# Patient Record
Sex: FEMALE | Race: ASIAN | NOT HISPANIC OR LATINO | Employment: UNEMPLOYED | URBAN - METROPOLITAN AREA
[De-identification: names, ages, dates, MRNs, and addresses within clinical notes are randomized per-mention and may not be internally consistent; named-entity substitution may affect disease eponyms.]

---

## 2017-05-08 ENCOUNTER — GYNECOLOGY VISIT (OUTPATIENT)
Dept: OBGYN | Facility: CLINIC | Age: 42
End: 2017-05-08
Payer: OTHER MISCELLANEOUS

## 2017-05-08 VITALS — BODY MASS INDEX: 22.12 KG/M2 | WEIGHT: 128 LBS | SYSTOLIC BLOOD PRESSURE: 102 MMHG | DIASTOLIC BLOOD PRESSURE: 62 MMHG

## 2017-05-08 DIAGNOSIS — N94.19 DYSPAREUNIA DUE TO MEDICAL CONDITION IN FEMALE: ICD-10-CM

## 2017-05-08 DIAGNOSIS — N91.2 AMENORRHEA: ICD-10-CM

## 2017-05-08 DIAGNOSIS — R10.2 PELVIC PAIN: ICD-10-CM

## 2017-05-08 DIAGNOSIS — N60.19 CHRONIC MASTITIS, UNSPECIFIED LATERALITY: ICD-10-CM

## 2017-05-08 PROCEDURE — 99204 OFFICE O/P NEW MOD 45 MIN: CPT | Performed by: OBSTETRICS & GYNECOLOGY

## 2017-05-08 NOTE — MR AVS SNAPSHOT
Trinidad Alvarado   2017 10:30 AM   Gynecology Visit   MRN: [id number]    Department:  [facility]   Dept Phone:  [phone]    Description:  Female : 1975   Provider:  Frankie Hager M.D.           Reason for Visit     New Patient           Allergies as of 2017     No Known Allergies      Vital Signs     Blood Pressure Weight Breastfeeding? Smoking Status          102/62 mmHg 58.06 kg (128 lb) Yes Never Smoker         Basic Information     Date Of Birth Sex Race Ethnicity Preferred Language    1975 Female  Non- English      Health Maintenance     Patient has no pending health maintenance at this time      Current Immunizations     Influenza Vaccine Quad Inj (Preserved) 2016  5:45 AM    Tdap Vaccine 2016  5:48 AM, 2016  2:30 PM      Below and/or attached are the medications your provider expects you to take. Review all of your home medications and newly ordered medications with your provider and/or pharmacist. Follow medication instructions as directed by your provider and/or pharmacist. Please keep your medication list with you and share with your provider. Update the information when medications are discontinued, doses are changed, or new medications (including over-the-counter products) are added; and carry medication information at all times in the event of emergency situations     Allergies:  No Known Allergies          Medications  Valid as of: May 08, 2017 - 11:30 AM    Generic Name Brand Name Tablet Size Instructions for use    Docusate Sodium (Cap)  MG Take 100 mg by mouth 2 times a day as needed for Constipation.        Hydrocortisone (Cream) hydrocortisone 1 % Apply cream to affected area BID PRN pain        Ibuprofen (Tab) MOTRIN 600 MG Take 1 Tab by mouth every 6 hours as needed.        Prenatal Multivit-Min-Fe-FA   Take  by mouth.        .                 Medicines prescribed today were sent to:     None      Medication refill  instructions:       If your prescription bottle indicates you have medication refills left, it is not necessary to call your provider’s office. Please contact your pharmacy and they will refill your medication.    If your prescription bottle indicates you do not have any refills left, you may request refills at any time through one of the following ways: The online [facility] system (except Urgent Care), by calling your provider’s office, or by asking your pharmacy to contact your provider’s office with a refill request. Medication refills are processed only during regular business hours and may not be available until the next business day. Your provider may request additional information or to have a follow-up visit with you prior to refilling your medication.   *Please Note: Medication refills are assigned a new Rx number when refilled electronically. Your pharmacy may indicate that no refills were authorized even though a new prescription for the same medication is available at the pharmacy. Please request the medicine by name with the pharmacy before contacting your provider for a refill.           [facility] [employer] Access Code: [email or web address]  Expires: 6/7/2017 11:30 AM    [facility]  A secure, online tool to manage your health information     [employer]® is a secure, online tool that connects you to your personalized health information from the privacy of your home -- day or night - making it very easy for you to manage your healthcare. Once the activation process is completed, you can even access your medical information using the [employer] ana rosa, which is available for free in the Apple [employer] store or Google Play store.     [facility] provides the following levels of access (as shown below):   My Chart Features   [facility] Doctor [facility]  Specialists [facility]  Urgent  Care [facility]  Primary Care  Doctor   Email your healthcare team securely and privately 24/7 X X X    Manage appointments: schedule your next  appointment; view details of past/upcoming appointments X      Request prescription refills. X      View recent personal medical records, including lab and immunizations X X X X   View health record, including health history, allergies, medications X X X X   Read reports about your outpatient visits, procedures, consult and ER notes X X X X   See your discharge summary, which is a recap of your hospital and/or ER visit that includes your diagnosis, lab results, and care plan. X X       How to register for [employer]:  1. Go to  [email or web address].  2. Click on the Sign Up Now box, which takes you to the New Member Sign Up page. You will need to provide the following information:  a. Enter your [employer] Access Code exactly as it appears at the top of this page. (You will not need to use this code after you’ve completed the sign-up process. If you do not sign up before the expiration date, you must request a new code.)   b. Enter your date of birth.   c. Enter your home email address.   d. Click Submit, and follow the next screen’s instructions.  3. Create a [employer] ID. This will be your [employer] login ID and cannot be changed, so think of one that is secure and easy to remember.  4. Create a [employer] password. You can change your password at any time.  5. Enter your Password Reset Question and Answer. This can be used at a later time if you forget your password.   6. Enter your e-mail address. This allows you to receive e-mail notifications when new information is available in [employer].  7. Click Sign Up. You can now view your health information.    For assistance activating your [employer] account, call [phone]

## 2017-05-08 NOTE — PROGRESS NOTES
Chief complaint; new patient    Trinidad Alvarado is a 41 y.o.  who presents complaining of pain during intercourse and frequent episodes of mastitis. Patient delivered generally  via normal spontaneous vaginal delivery she has been nursing she states she gets mastitis every 2-3 months. Her baby is currently consuming solid foods and doing well. She states her baby likes to nurse every 1-2 hours around-the-clock. Patient also complains of having pain during intercourse with vaginal dryness and decreased libido. No libido. No menstrual cycles. The patient states she takes antibiotics for 4-5 days when she gets mastitis. She states she's only had intercourse 2 times since childbirth. The patient states she develops mastitis if her baby does not nurse at which time she developes low-grade fever and takes antibiotics for 4-5 days. The patient is an OB/GYN.      Review of systems; denies fever chills abdominal pain, denies chest pain shortness of breath or urinary symptoms  Past medical history-History reviewed. No pertinent past medical history.  Past surgical history-  Past Surgical History   Procedure Laterality Date   • Other abdominal surgery       abdominal surgery as child     Allergies-Review of patient's allergies indicates no known allergies.  Medications-  Current Outpatient Prescriptions on File Prior to Visit   Medication Sig Dispense Refill   • docusate sodium 100 MG Cap Take 100 mg by mouth 2 times a day as needed for Constipation. 60 Cap 3   • hydrocortisone 1 % Cream Apply cream to affected area BID PRN pain 1 Tube 0   • ibuprofen (MOTRIN) 600 MG Tab Take 1 Tab by mouth every 6 hours as needed. 30 Tab 3   • Prenatal Multivit-Min-Fe-FA (PRENATAL 1 + IRON PO) Take  by mouth.       No current facility-administered medications on file prior to visit.     Social history-  Social History     Social History   • Marital Status:      Spouse Name: N/A   • Number of Children: N/A   • Years of  Education: N/A     Occupational History   • Not on file.     Social History Main Topics   • Smoking status: Never Smoker    • Smokeless tobacco: Not on file   • Alcohol Use: No   • Drug Use: No   • Sexual Activity:     Partners: Male     Other Topics Concern   • Not on file     Social History Narrative     Past Family History-no history of breast or ovarian cancer    Physical examination;  Alert and oriented x3  General a thin well-developed well-nourished female in no apparent distress  Filed Vitals:    05/08/17 1049   BP: 102/62   Weight: 58.06 kg (128 lb)     Skin is warm and dry  Neck-supple  HEENT-head-atraumatic, normocephalic, EOMI, PERRLA  Cardiovascular-regular rate and rhythm, normal S1-S2, no murmurs or gallops  Lungs-clear to auscultation bilaterally  Back-negative CVA tenderness  Abdomen-nondistended positive bowel sounds soft nontender no masses or hepatosplenomegaly  Pelvic examination;  External genitalia-no visible lesions   Vagina-no blood or discharge  Cervix-no gross lesions  Uterus-normal size and shape, nontender  Adnexa without mass or tenderness  Extremities without cyanosis clubbing or edema  Neurologic exam grossly intact    Urine hCG-negative    Impression;  Pelvic pain-completely normal physical examination  Dyspareunia  Decreased libido-likely secondary to continued nursing  Amenorrhea-likely secondary to continue nursing  Chronic mastitis?-Probable just breast engorgement      Plan;  Reassurance  Patient is encouraged to decrease nursing episodes to every 4-5 hours  Patient is encouraged to contact the office immediately if she develops mastitis I can examine her  Patient has not had a slight mammogram but is still nursing-assisted and she discontinues nursing she will need mammogram immediately  Patient needs annual examination for Pap smear      45  Minutes spent with the patient, greater than 50% of the time spent on counseling and coordination of care. All questions answered in  detail.